# Patient Record
Sex: FEMALE | Race: BLACK OR AFRICAN AMERICAN | NOT HISPANIC OR LATINO | URBAN - METROPOLITAN AREA
[De-identification: names, ages, dates, MRNs, and addresses within clinical notes are randomized per-mention and may not be internally consistent; named-entity substitution may affect disease eponyms.]

---

## 2018-11-15 ENCOUNTER — OFFICE VISIT (OUTPATIENT)
Dept: OPTOMETRY | Facility: CLINIC | Age: 68
End: 2018-11-15
Payer: MEDICARE

## 2018-11-15 DIAGNOSIS — E11.3413 SEVERE NONPROLIFERATIVE DIABETIC RETINOPATHY OF BOTH EYES WITH MACULAR EDEMA ASSOCIATED WITH TYPE 2 DIABETES MELLITUS: Primary | ICD-10-CM

## 2018-11-15 DIAGNOSIS — Z96.1 PSEUDOPHAKIA: ICD-10-CM

## 2018-11-15 PROCEDURE — 92134 CPTRZ OPH DX IMG PST SGM RTA: CPT | Mod: PBBFAC,PO | Performed by: OPTOMETRIST

## 2018-11-15 PROCEDURE — 99999 PR PBB SHADOW E&M-NEW PATIENT-LVL II: CPT | Mod: PBBFAC,,, | Performed by: OPTOMETRIST

## 2018-11-15 PROCEDURE — 99202 OFFICE O/P NEW SF 15 MIN: CPT | Mod: PBBFAC,PO,25 | Performed by: OPTOMETRIST

## 2018-11-15 PROCEDURE — 92004 COMPRE OPH EXAM NEW PT 1/>: CPT | Mod: S$PBB,,, | Performed by: OPTOMETRIST

## 2018-11-15 NOTE — PROGRESS NOTES
Subjective:       Patient ID: Eloy Flannery is a 68 y.o. female      Chief Complaint   Patient presents with    Concerns About Ocular Health     History of Present Illness  Dls: April 2018     69 y/o female presents today with c/o blurry vision at near ou.  Pt does not wear any glasses.     + od tearing  No itching  No burning  No pain  No ha's  + floaters  No flashes     Eye meds  None      Assessment/Plan:     1. Severe nonproliferative diabetic retinopathy of both eyes with macular edema associated with type 2 diabetes mellitus  Pt is here with daughter. States h/o DM controlled with diet. [facility] [place] [facility] and states BS were doing well. Moved to [facility] in June but has not established care with PCP here yet. advised pt to establish care with PCP here and have updated lab work done to check for DM. Severe DR OU with DME OU. Discussed diagnosis with patient and possible treatment options including lasers, injections, and surgery if needed. Referral to Dr. Dorado for evaluation. Pt left today with appt scheduled.       - Posterior Segment OCT Retina-Both eyes  - Ambulatory referral to Ophthalmology    2. Pseudophakia  Centered. Clear.  Advised pt no Rx at this time, need to have retina treated and can RTC for MRx when stable.     Follow-up for Retina consult with Dr. Dorado.

## 2019-01-02 ENCOUNTER — INITIAL CONSULT (OUTPATIENT)
Dept: OPHTHALMOLOGY | Facility: CLINIC | Age: 69
End: 2019-01-02
Attending: OPHTHALMOLOGY
Payer: MEDICARE

## 2019-01-02 VITALS — DIASTOLIC BLOOD PRESSURE: 93 MMHG | HEART RATE: 86 BPM | SYSTOLIC BLOOD PRESSURE: 183 MMHG

## 2019-01-02 DIAGNOSIS — E11.3413 TYPE 2 DIABETES MELLITUS WITH BOTH EYES AFFECTED BY SEVERE NONPROLIFERATIVE RETINOPATHY AND MACULAR EDEMA, WITHOUT LONG-TERM CURRENT USE OF INSULIN: Primary | ICD-10-CM

## 2019-01-02 PROCEDURE — 99999 PR PBB SHADOW E&M-EST. PATIENT-LVL III: CPT | Mod: PBBFAC,,, | Performed by: OPHTHALMOLOGY

## 2019-01-02 PROCEDURE — 99213 OFFICE O/P EST LOW 20 MIN: CPT | Mod: PBBFAC,PO,25 | Performed by: OPHTHALMOLOGY

## 2019-01-02 PROCEDURE — 99999 PR PBB SHADOW E&M-EST. PATIENT-LVL III: ICD-10-PCS | Mod: PBBFAC,,, | Performed by: OPHTHALMOLOGY

## 2019-01-02 PROCEDURE — 92225 PR SPECIAL EYE EXAM, INITIAL: ICD-10-PCS | Mod: 50,S$PBB,, | Performed by: OPHTHALMOLOGY

## 2019-01-02 PROCEDURE — 92225 PR SPECIAL EYE EXAM, INITIAL: CPT | Mod: 50,PBBFAC,PO | Performed by: OPHTHALMOLOGY

## 2019-01-02 PROCEDURE — 92225 PR SPECIAL EYE EXAM, INITIAL: CPT | Mod: 50,S$PBB,, | Performed by: OPHTHALMOLOGY

## 2019-01-02 PROCEDURE — 92014 COMPRE OPH EXAM EST PT 1/>: CPT | Mod: S$PBB,,, | Performed by: OPHTHALMOLOGY

## 2019-01-02 PROCEDURE — 92014 PR EYE EXAM, EST PATIENT,COMPREHESV: ICD-10-PCS | Mod: S$PBB,,, | Performed by: OPHTHALMOLOGY

## 2019-01-02 NOTE — LETTER
January 2, 2019      Kisha Garcia, OD  [address]  [place] [address]           [facility]  [address] [facility] [address]  [place] [address]  Phone: [phone]  Fax: [phone]          Patient: Eloy Flannery   MR Number: [id number]   YOB: 1950   Date of Visit: 1/2/2019       Dear Dr. Kisha Garcia:    Thank you for referring Eloy Flannery to me for evaluation. Attached you will find relevant portions of my assessment and plan of care.    If you have questions, please do not hesitate to call me. I look forward to following Eloy Flannery along with you.    Sincerely,    Amauri Dorado MD    Enclosure  CC:  No Recipients    If you would like to receive this communication electronically, please contact [email or web address] or [phone] to request more information on [employer] Link access.    For providers and/or their staff who would like to refer a patient to [facility], please contact us through our one-stop-shop provider referral line, [facility], at [phone].    If you feel you have received this communication in error or would no longer like to receive these types of communications, please e-mail [email or web address]

## 2019-01-02 NOTE — PROGRESS NOTES
HPI     69 yo female referred by Dr Garcia for DR/DME.     Diagnosed in 2016 with DM.  S/P PC IOL OU.. [facility]  Diabetes controlled by diet.     Pt complaint of ou tearing, OD greater than OS  Pt feels vision is getting better but c/o problems seeing small print.    Does not have glasses.  No f/f/pain OU    Medical history unclear.  Pt says treated for HTN in past and DM is diet   controlled but pt has no PMD as she moved from [place].  Not taking any meds   now.      Last edited by Amauri Dorado MD on 1/2/2019  4:24 PM. (History)            Assessment /Plan     For exam results, see Encounter Report.    Type 2 diabetes mellitus with both eyes affected by severe nonproliferative retinopathy and macular edema, without long-term current use of insulin  -     Flourescein Angiography - OU - Both Eyes; Future  -     Posterior Segment OCT Retina-Both eyes; Future      Reviewed prior OCT showing ME OU      Pt with unclear history of DM and HTN  Recommend estab care with new PMD ASAP and control CV risk factors.  Offered appt with PMD here but pt wants to make own appt    Diabetic Retinopathy discussed in detail, all questions answered  Stressed importance of good BS/BP/Chol Control  RTC 1 month with OCT and FA, sooner PRN    Will start Rx for ME if not impoving with CV risk factor control

## 2019-01-02 NOTE — PROGRESS NOTES
1438- Pt has uncontrolled HTN, currently asymptomatic. Pt recently moved from [place] and does not have a local PCP. She has not been taking bp medication but has been on bp meds in the past. Informed pt of health risks associated with uncontrolled HTN. Strongly encouraged pt to establish PCP ASAP for eval and Tx of HTN. Pt did not wish to make an appt to be evaluated today at this facility but does plan to establish pcp soon. Recommended pt avoid salty foods. Advised pt to go to ER if she started to develop any symptoms that coincide with high blood pressure. Pt expressed understanding.---Quinton

## 2019-01-02 NOTE — PATIENT INSTRUCTIONS
Controlling High Blood Pressure  High blood pressure (hypertension) is often called the silent killer. This is because many people who have it dont know it. High blood pressure is defined as 140/90 mm Hg or higher. Know your blood pressure and remember to check it regularly. Doing so can save your life. Here are some things you can do to help control your blood pressure.    Choose heart-healthy foods  · Select low-salt, low-fat foods. Limit sodium intake to 2,400 mg per day or the amount suggested by your healthcare provider.  · Limit canned, dried, cured, packaged, and fast foods. These can contain a lot of salt.  · Eat 8 to 10 servings of fruits and vegetables every day.  · Choose lean meats, fish, or chicken.  · Eat whole-grain pasta, brown rice, and beans.  · Eat 2 to 3 servings of low-fat or fat-free dairy products.  · Ask your doctor about the DASH eating plan. This plan helps reduce blood pressure.  · When you go to a restaurant, ask that your meal be prepared with no added salt.  Maintain a healthy weight  · Ask your healthcare provider how many calories to eat a day. Then stick to that number.  · Ask your healthcare provider what weight range is healthiest for you. If you are overweight, a weight loss of only 3% to 5% of your body weight can help lower blood pressure. Generally, a good weight loss goal is to lose 10% of your body weight in a year.  · Limit snacks and sweets.  · Get regular exercise.  Get up and get active  · Choose activities you enjoy. Find ones you can do with friends or family. This includes bicycling, dancing, walking, and jogging.  · Park farther away from building entrances.  · Use stairs instead of the elevator.  · When you can, walk or bike instead of driving.  · [place] leaves, garden, or do household repairs.  · Be active at a moderate to vigorous level of physical activity for at least 40 minutes for a minimum of 3 to 4 days a week.   Manage stress  · Make time to relax and enjoy  life. Find time to laugh.  · Communicate your concerns with your loved ones and your healthcare provider.  · Visit with family and friends, and keep up with hobbies.  Limit alcohol and quit smoking  · Men should have no more than 2 drinks per day.  · Women should have no more than 1 drink per day.  · Talk with your healthcare provider about quitting smoking. Smoking significantly increases your risk for heart disease and stroke. Ask your healthcare provider about community smoking cessation programs and other options.  Medicines  If lifestyle changes arent enough, your healthcare provider may prescribe high blood pressure medicine. Take all medicines as prescribed. If you have any questions about your medicines, ask your healthcare provider before stopping or changing them.   © 1882-9419 [employer]. [address] [place] [address], [place] [address]. All rights reserved. This information is not intended as a substitute for professional medical care. Always follow your healthcare professional's instructions.